# Patient Record
(demographics unavailable — no encounter records)

---

## 2024-12-31 NOTE — HISTORY OF PRESENT ILLNESS
[FreeTextEntry6] : 2.5 year well Has improved with socialization, will express and point more.  Social does got to , will do 3K this fall Will eat fruits and milk, but can be picky Does get constipated at times. No potty trained, will volicaze when she urinates or stolls

## 2025-01-07 NOTE — DISCUSSION/SUMMARY
[FreeTextEntry1] : 2.5 year well Has improved with socialization, will express and point more.  Social does got to , will do 3K this fall Will eat fruits and milk, but can be picky Does get constipated at times. No potty trained, will vocalize when she urinates or stools Vaccine: Flu  [] : The components of the vaccine(s) to be administered today are listed in the plan of care. The disease(s) for which the vaccine(s) are intended to prevent and the risks have been discussed with the caretaker.  The risks are also included in the appropriate vaccination information statements which have been provided to the patient's caregiver.  The caregiver has given consent to vaccinate.

## 2025-03-21 NOTE — HISTORY OF PRESENT ILLNESS
[FreeTextEntry6] : ESTABLISHED PT FOLLOW UP [facility] ADMISSION FOR TELEMETRY MOM FOUND HER WITH A PILL IN HER MOUTH ON THE COUCH AT HOME  MOM HAD FILLED HER MEDICATION CONTAINER PRIOR. (METOPROLOL 50MG XR AND METFORMIN 500MG ) KEEPS THEM IN A CLOSED CONTAINER ON THE TOP SHELF IN THE KITCHEN THAT IS ATTACHED TO THE LIVING ROOM SHE SWIPED HER MOUTH AND TOOK HER TO THE ER  REFUSED [facility]COL. EKG WNL ON TELEMETRY OVERNIGHT WITHOUT INCIDENT

## 2025-07-18 NOTE — DEVELOPMENTAL MILESTONES
[Normal Development] : Normal Development [None] : none [Goes to the bathroom and urinates] : does not go to bathroom and urinates by self [Plays and shares with others] : plays and shares with others [Put on coat, jacket, or shirt by self] : puts on coat, jacket, or shirt by self [Begins to play make-believe] : begins to play make-believe [Uses 3-word sentences] : uses 3-word sentences [Uses words that are 75% intelligible] : uses words that are 75% intelligible to strangers [Understands simple prepositions] : understands simple prepositions [Climbs on and off couch] : climbs on and off couch or chair [Jumps forward] : jumps forward [Draws a single [place]] : draws a single [place] [Draws a person with head] : draws a person with head and one other body part [Yes] : Completed. [FreeTextEntry1] : BRUNO reviewed all concerns addressed

## 2025-07-18 NOTE — DISCUSSION/SUMMARY
[Normal Growth] : growth [Normal Development] : development [None] : No known medical problems [No Elimination Concerns] : elimination [No Feeding Concerns] : feeding [No Skin Concerns] : skin [Normal Sleep Pattern] : sleep [Encouraging Literacy Activities] : encouraging literacy activities [Playing with Peers] : playing with peers [Promoting Physical Activity] : promoting physical activity [Safety] : safety [No Medications] : ~He/She~ is not on any medications [Mother] : mother [] : The components of the vaccine(s) to be administered today are listed in the plan of care. The disease(s) for which the vaccine(s) are intended to prevent and the risks have been discussed with the caretaker.  The risks are also included in the appropriate vaccination information statements which have been provided to the patient's caregiver.  The caregiver has given consent to vaccinate. [FreeTextEntry1] : refer to mike ortiz for R esotropia apply insect repellant cream prior to going to  school form completed.

## 2025-07-18 NOTE — HISTORY OF PRESENT ILLNESS
[Mother] : mother [whole ___ oz/d] : consumes [unfilled] oz of whole cow's milk per day [Normal] : Normal [Sippy cup use] : Sippy cup use [Brushing teeth] : Brushing teeth [Yes] : Patient goes to dentist yearly [Tap water] : Primary Fluoride Source: Tap water [In nursery school] : In nursery school [No] : Not at  exposure [Car seat in back seat] : Car seat in back seat [Up to date] : Up to date [de-identified] : drinks water with one drop of juice, well balanced [FreeTextEntry8] : still in diapers [FreeTextEntry9] :  [NO] : No